# Patient Record
(demographics unavailable — no encounter records)

---

## 2024-12-08 NOTE — PHYSICAL EXAM
[General Appearance - Well Developed] : well developed [General Appearance - Well Nourished] : well nourished [Normal Appearance] : normal appearance [Well Groomed] : well groomed [General Appearance - In No Acute Distress] : no acute distress [Abdomen Soft] : soft [Abdomen Tenderness] : non-tender [Abdomen Mass (___ Cm)] : no abdominal mass palpated [Abdomen Hernia] : no hernia was discovered [Costovertebral Angle Tenderness] : no ~M costovertebral angle tenderness [Urinary Bladder Findings] : the bladder was normal on palpation [Skin Color & Pigmentation] : normal skin color and pigmentation [Edema] : no peripheral edema [] : no respiratory distress [Respiration, Rhythm And Depth] : normal respiratory rhythm and effort [Exaggerated Use Of Accessory Muscles For Inspiration] : no accessory muscle use [Oriented To Time, Place, And Person] : oriented to person, place, and time [Affect] : the affect was normal [Mood] : the mood was normal [Not Anxious] : not anxious [Normal Station and Gait] : the gait and station were normal for the patient's age

## 2024-12-09 NOTE — ASSESSMENT
[FreeTextEntry1] : Maintain fluids intake Low-salt diet Reduce animal proteins intake  Continue PSA screening with PCP  24 hr urine ordered to be done before next visit Renal sono ordered to be done at or before next visit Follow up in 12 months

## 2024-12-09 NOTE — HISTORY OF PRESENT ILLNESS
[None] : None [FreeTextEntry1] : 60y/o man Hx of kidney stones, LEFT URS laser lithotripsy in Oct 2015. Stones =  100% Calcium oxalate monohydrate. Hx of Gastric sleeve bariatric surgery Here for follow up On calcium supplements on advice of GI/Bariatric surgeon  I personally reviewed labs and images and discussed all pertinent findings with patient. 24 hr urine: vol 3.9 liters, high sodium/calcium/UUN/PCR/Sulfites, pH 5.58, normal citrate Renal ultrasound: No stones bilaterally, no hydronephrosis

## 2025-02-05 NOTE — ASSESSMENT
[FreeTextEntry1] : Left lateral neck melanoma in situ I had a long discussion with the pt regarding his diagnosis, prognosis and all management options Rec WEX in the OR  Surgical procedure discussed in detail All questions answered Medical clearance Will get slides reviewed at [facility]

## 2025-02-05 NOTE — PHYSICAL EXAM
[Normal] : supple, no neck mass and thyroid not enlarged [Normal Neck Lymph Nodes] : normal neck lymph nodes  [Normal Supraclavicular Lymph Nodes] : normal supraclavicular lymph nodes [Normal Groin Lymph Nodes] : normal groin lymph nodes [Normal Axillary Lymph Nodes] : normal axillary lymph nodes [Normal] : oriented to person, place and time, with appropriate affect [de-identified] : 4 mm scar left lateral neck at site of recent biopsy - photograph taken

## 2025-02-05 NOTE — ADDENDUM
[FreeTextEntry1] : I, Courtney Bledsoe, acted solely as a scribe for Dr. Brandon Christy on this date 02/05/2025.

## 2025-02-05 NOTE — HISTORY OF PRESENT ILLNESS
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 Referred by Dr. Genaro Carmona.  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

## 2025-02-05 NOTE — HISTORY OF PRESENT ILLNESS
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 Referred by Dr. Genaro Carmona.  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

## 2025-02-05 NOTE — CONSULT LETTER
[Dear  ___] : Dear  [unfilled], [Consult Letter:] : I had the pleasure of evaluating your patient, [unfilled]. [Please see my note below.] : Please see my note below. [Sincerely,] : Sincerely, [FreeTextEntry3] : Brandon Christy MD FACS

## 2025-02-05 NOTE — PHYSICAL EXAM
[Normal] : supple, no neck mass and thyroid not enlarged [Normal Neck Lymph Nodes] : normal neck lymph nodes  [Normal Supraclavicular Lymph Nodes] : normal supraclavicular lymph nodes [Normal Groin Lymph Nodes] : normal groin lymph nodes [Normal Axillary Lymph Nodes] : normal axillary lymph nodes [Normal] : oriented to person, place and time, with appropriate affect [de-identified] : 4 mm scar left lateral neck at site of recent biopsy - photograph taken

## 2025-02-05 NOTE — ASSESSMENT
Ordered pt and guest regular louis Cespedes  02/08/17 2210     [FreeTextEntry1] : Left lateral neck melanoma in situ I had a long discussion with the pt regarding his diagnosis, prognosis and all management options Rec WEX in the OR  Surgical procedure discussed in detail All questions answered Medical clearance Will get slides reviewed at [facility]

## 2025-04-02 NOTE — PHYSICAL EXAM
[Normal] : supple, no neck mass and thyroid not enlarged [Normal Neck Lymph Nodes] : normal neck lymph nodes  [Normal Supraclavicular Lymph Nodes] : normal supraclavicular lymph nodes [Normal Groin Lymph Nodes] : normal groin lymph nodes [Normal Axillary Lymph Nodes] : normal axillary lymph nodes [Normal] : oriented to person, place and time, with appropriate affect [de-identified] : left lateral neck incision healing well, no evidence of infection

## 2025-04-02 NOTE — ASSESSMENT
[FreeTextEntry1] : Left lateral neck melanoma in situ S/p WEX -no residual melanoma on final pathology Normal postoperative course Continue derm surveillance RTO 3-6 months     Enclosed pathology report

## 2025-04-02 NOTE — PHYSICAL EXAM
[Normal] : supple, no neck mass and thyroid not enlarged [Normal Neck Lymph Nodes] : normal neck lymph nodes  [Normal Supraclavicular Lymph Nodes] : normal supraclavicular lymph nodes [Normal Groin Lymph Nodes] : normal groin lymph nodes [Normal Axillary Lymph Nodes] : normal axillary lymph nodes [Normal] : oriented to person, place and time, with appropriate affect [de-identified] : left lateral neck incision healing well, no evidence of infection